# Patient Record
Sex: MALE | URBAN - METROPOLITAN AREA
[De-identification: names, ages, dates, MRNs, and addresses within clinical notes are randomized per-mention and may not be internally consistent; named-entity substitution may affect disease eponyms.]

---

## 2021-01-11 ENCOUNTER — APPOINTMENT (RX ONLY)
Dept: URBAN - METROPOLITAN AREA CLINIC 43 | Facility: CLINIC | Age: 31
Setting detail: DERMATOLOGY
End: 2021-01-11

## 2021-01-11 VITALS — TEMPERATURE: 97.5 F

## 2021-01-11 DIAGNOSIS — L24 IRRITANT CONTACT DERMATITIS: ICD-10-CM

## 2021-01-11 DIAGNOSIS — L30.9 DERMATITIS, UNSPECIFIED: ICD-10-CM

## 2021-01-11 PROBLEM — L24.9 IRRITANT CONTACT DERMATITIS, UNSPECIFIED CAUSE: Status: ACTIVE | Noted: 2021-01-11

## 2021-01-11 PROCEDURE — ? PRESCRIPTION

## 2021-01-11 PROCEDURE — 99203 OFFICE O/P NEW LOW 30 MIN: CPT

## 2021-01-11 PROCEDURE — ? COUNSELING

## 2021-01-11 PROCEDURE — ? TREATMENT REGIMEN

## 2021-01-11 PROCEDURE — ? ADDITIONAL NOTES

## 2021-01-11 RX ORDER — HYDROCORTISONE 25 MG/G
OINTMENT TOPICAL BID
Qty: 1 | Refills: 1 | Status: ERX | COMMUNITY
Start: 2021-01-11

## 2021-01-11 RX ADMIN — HYDROCORTISONE: 25 OINTMENT TOPICAL at 00:00

## 2021-01-11 ASSESSMENT — LOCATION DETAILED DESCRIPTION DERM
LOCATION DETAILED: LEFT VENTRAL PROXIMAL FOREARM
LOCATION DETAILED: LEFT RADIAL VENTRAL WRIST
LOCATION DETAILED: RIGHT SUPERIOR CENTRAL MALAR CHEEK
LOCATION DETAILED: LEFT CENTRAL EYEBROW

## 2021-01-11 ASSESSMENT — LOCATION SIMPLE DESCRIPTION DERM
LOCATION SIMPLE: LEFT FOREARM
LOCATION SIMPLE: RIGHT CHEEK
LOCATION SIMPLE: LEFT EYEBROW
LOCATION SIMPLE: LEFT WRIST

## 2021-01-11 ASSESSMENT — LOCATION ZONE DERM
LOCATION ZONE: FACE
LOCATION ZONE: ARM

## 2021-01-11 NOTE — HPI: RASH
What Type Of Note Output Would You Prefer (Optional)?: Standard Output
How Severe Is Your Rash?: mild
Is This A New Presentation, Or A Follow-Up?: Rash
Additional History: Pt thinks allergic reaction to artificial tears. Recently had LASIK 11/2020.

## 2021-01-11 NOTE — PROCEDURE: ADDITIONAL NOTES
Additional Notes: Lesions resolving\\nPt reports areas bubbling up then crusting over\\nOccured in the summer on his ankle\\nConsider ACD to plant vs. bullous insect bite reaction vs. other\\nRTC when lesion occurs
Render Risk Assessment In Note?: no
Detail Level: Simple
Additional Notes: Dx: eyelid dermatitis\\nPt had lasik in fall\\nUses lubricating eye drops\\n\\nStart HCT 2.5% ointment BID x 2 weeks + aquaphor\\nAvoid rubbing\\nGentle temps\\nRECHECK at follow up and discuss maintenance\\nIf rash recurs, consider patch testing\\nF/u 2-3 weeks

## 2021-01-11 NOTE — PROCEDURE: MIPS QUALITY
Detail Level: Detailed
Additional Notes: COVID MIPS \\n\\nCOVID-19 Screening:\\nHave you traveled internationally or on a cruise ship in the last 14 days? No. \\nHave you traveled out of state within the [place] in the last 14 days? No.\\nHave you been in contact with anyone who is suspected of having, being tested, or has tested positive for COVID? No.\\nHave you been experiencing any fever or respiratory symptoms? No.\\nHave you been experiencing any GI symptoms (diarrhea, vomiting, nausea, stomach pain) in the last week? No.

## 2021-01-11 NOTE — PROCEDURE: TREATMENT REGIMEN
Action 3: Continue
Treatment 1: Hydrocortisone 2.5% ointment
Action 1: Start
Show Vanicream Line: Yes
Detail Level: Zone
Sig For Treatment 1 (If Needed): twice daily
Start Regimen: Aquaphor on top of HCT

## 2021-02-08 ENCOUNTER — APPOINTMENT (RX ONLY)
Dept: URBAN - METROPOLITAN AREA CLINIC 43 | Facility: CLINIC | Age: 31
Setting detail: DERMATOLOGY
End: 2021-02-08

## 2021-02-08 VITALS — TEMPERATURE: 97.3 F

## 2021-02-08 DIAGNOSIS — L24 IRRITANT CONTACT DERMATITIS: ICD-10-CM | Status: RESOLVED

## 2021-02-08 PROBLEM — L24.9 IRRITANT CONTACT DERMATITIS, UNSPECIFIED CAUSE: Status: ACTIVE | Noted: 2021-02-08

## 2021-02-08 PROCEDURE — 99213 OFFICE O/P EST LOW 20 MIN: CPT

## 2021-02-08 PROCEDURE — ? ADDITIONAL NOTES

## 2021-02-08 PROCEDURE — ? PRESCRIPTION

## 2021-02-08 PROCEDURE — ? TREATMENT REGIMEN

## 2021-02-08 PROCEDURE — ? COUNSELING

## 2021-02-08 RX ORDER — HYDROCORTISONE 25 MG/G
OINTMENT TOPICAL BID
Qty: 1 | Refills: 1 | Status: CANCELLED
Stop reason: CLARIF

## 2021-02-08 ASSESSMENT — LOCATION SIMPLE DESCRIPTION DERM
LOCATION SIMPLE: LEFT EYEBROW
LOCATION SIMPLE: RIGHT CHEEK

## 2021-02-08 ASSESSMENT — LOCATION ZONE DERM: LOCATION ZONE: FACE

## 2021-02-08 ASSESSMENT — LOCATION DETAILED DESCRIPTION DERM
LOCATION DETAILED: RIGHT SUPERIOR CENTRAL MALAR CHEEK
LOCATION DETAILED: LEFT CENTRAL EYEBROW

## 2021-02-08 NOTE — PROCEDURE: MIPS QUALITY
Detail Level: Detailed
Thank you for choosing the Pulmonary Services Department, at [facility], as your Pulmonary Specialists.  We actively use feedback to constantly improve and deliver the best care possible. To provide the best experience, we are collecting feedback from you on how we performed.  You may receive a survey in the mail or through your e-mail to evaluate how we did. Please take a moment and share your thoughts.      If for any reason you feel that we did not meet your expectations or you want to share a positive experience, please give us a call. Your feedback helps us know how we are doing and what we can be doing better.    Office hours: 8:00 am to 4:30 pm, Monday - Friday  Phone: [phone].      YOUR TEST RESULTS    If you have any concerns regarding any testing ordered with your visit, please contact our office at the above number.    Otherwise, your test results will be communicated to you in one of the following ways:    - Follow-up office visit  - Phone call  - Through [facility]  - Mailed letter          
Additional Notes: COVID MIPS \\n\\nCOVID-19 Screening:\\nHave you traveled internationally or on a cruise ship in the last 14 days? No. \\nHave you traveled out of state within the [place] in the last 14 days? No.\\nHave you been in contact with anyone who is suspected of having, being tested, or has tested positive for COVID? No.\\nHave you been experiencing any fever or respiratory symptoms? No.\\nHave you been experiencing any GI symptoms (diarrhea, vomiting, nausea, stomach pain) in the last week? No.

## 2021-02-08 NOTE — PROCEDURE: TREATMENT REGIMEN
Continue Regimen: Hydrocortisone only as needed
Action 3: Continue
Action 1: Start
Show Vanicream Line: Yes
Detail Level: Zone
Sig For Treatment 1 (If Needed): twice daily
Start Regimen: Manage with Vaseline nightly

## 2021-02-08 NOTE — PROCEDURE: ADDITIONAL NOTES
Render Risk Assessment In Note?: no
Additional Notes: Dx: eyelid dermatitis\\nPt had lasik in fall\\nUses lubricating eye drops\\n\\nResolved\\n\\nDiscussed managing with Vaseline nightly, use hydrocortisone only as needed\\nHas discontinued use of eye drops\\nAvoid rubbing\\nGentle temps
Detail Level: Simple

## 2023-10-03 ENCOUNTER — APPOINTMENT (RX ONLY)
Dept: URBAN - METROPOLITAN AREA CLINIC 43 | Facility: CLINIC | Age: 33
Setting detail: DERMATOLOGY
End: 2023-10-03

## 2023-10-03 DIAGNOSIS — H01.13 ECZEMATOUS DERMATITIS OF EYELID: ICD-10-CM | Status: INADEQUATELY CONTROLLED

## 2023-10-03 PROBLEM — H01.135 ECZEMATOUS DERMATITIS OF LEFT LOWER EYELID: Status: ACTIVE | Noted: 2023-10-03

## 2023-10-03 PROBLEM — H01.139 ECZEMATOUS DERMATITIS OF UNSPECIFIED EYE, UNSPECIFIED EYELID: Status: ACTIVE | Noted: 2023-10-03

## 2023-10-03 PROCEDURE — 99214 OFFICE O/P EST MOD 30 MIN: CPT

## 2023-10-03 PROCEDURE — ? ADDITIONAL NOTES

## 2023-10-03 PROCEDURE — ? COUNSELING

## 2023-10-03 PROCEDURE — ? PRESCRIPTION

## 2023-10-03 PROCEDURE — ? TREATMENT REGIMEN

## 2023-10-03 RX ORDER — TACROLIMUS 1 MG/G
OINTMENT TOPICAL
Qty: 60 | Refills: 2 | Status: ERX | COMMUNITY
Start: 2023-10-03

## 2023-10-03 RX ORDER — HYDROCORTISONE 25 MG/G
OINTMENT TOPICAL BID
Qty: 28.35 | Refills: 2 | Status: ERX | COMMUNITY
Start: 2023-10-03

## 2023-10-03 RX ADMIN — HYDROCORTISONE: 25 OINTMENT TOPICAL at 00:00

## 2023-10-03 RX ADMIN — TACROLIMUS: 1 OINTMENT TOPICAL at 00:00

## 2023-10-03 ASSESSMENT — LOCATION SIMPLE DESCRIPTION DERM
LOCATION SIMPLE: RIGHT CHEEK
LOCATION SIMPLE: RIGHT EYEBROW
LOCATION SIMPLE: LEFT INFERIOR EYELID
LOCATION SIMPLE: LEFT EYEBROW

## 2023-10-03 ASSESSMENT — LOCATION DETAILED DESCRIPTION DERM
LOCATION DETAILED: RIGHT CENTRAL EYEBROW
LOCATION DETAILED: LEFT MEDIAL INFERIOR EYELID
LOCATION DETAILED: RIGHT SUPERIOR CENTRAL MALAR CHEEK
LOCATION DETAILED: LEFT CENTRAL EYEBROW

## 2023-10-03 ASSESSMENT — LOCATION ZONE DERM
LOCATION ZONE: EYELID
LOCATION ZONE: FACE

## 2023-10-03 NOTE — PROCEDURE: TREATMENT REGIMEN
Show Skinceuticals Line: Yes
Detail Level: Zone
Action 4: Continue
Treatment 1: Hydrocortisone 2.5% ointment
Sig For Treatment 1 (If Needed): twice daily
Action 1: Start

## 2023-10-03 NOTE — PROCEDURE: ADDITIONAL NOTES
Render Risk Assessment In Note?: no
Detail Level: Simple
Additional Notes: Eyelid dermatitis \\nPatient reports seasonal flares \\nImprovement seen with hydrocortisone 2.5 ointment using every 2-3 weeks. \\n\\nDiscussed nature of condition and treatment options\\nStart treatment \\nHCT 2.5% ointment BID x 2 weeks + vaseline or aquaphor on top. \\nTacrolimus 1% ointment apply BID for maintenance \\n\\nAvoid rubbing eyes\\nWash face with mild temperature water.\\n\\nAppropriate steroid usage discussed\\nAdded on non steroidal for maintenance and to increase time between steroid usages\\n\\nF/u prn